# Patient Record
Sex: FEMALE | Race: WHITE | Employment: OTHER | URBAN - METROPOLITAN AREA
[De-identification: names, ages, dates, MRNs, and addresses within clinical notes are randomized per-mention and may not be internally consistent; named-entity substitution may affect disease eponyms.]

---

## 2018-09-14 ENCOUNTER — HOSPITAL ENCOUNTER (OUTPATIENT)
Age: 73
Discharge: HOME | End: 2018-09-16
Payer: MEDICARE

## 2018-09-14 LAB
ALBUMIN SERPL-MCNC: 3.9 G/DL (ref 3.5–5.2)
ALP BLD-CCNC: 71 U/L (ref 35–104)
ALT SERPL-CCNC: 21 U/L (ref 0–32)
ANION GAP SERPL CALCULATED.3IONS-SCNC: 13 MMOL/L (ref 7–16)
AST SERPL-CCNC: 24 U/L (ref 0–31)
BACTERIA: ABNORMAL /HPF
BASOPHILS ABSOLUTE: 0.03 E9/L (ref 0–0.2)
BASOPHILS RELATIVE PERCENT: 0.7 % (ref 0–2)
BILIRUB SERPL-MCNC: 0.4 MG/DL (ref 0–1.2)
BILIRUBIN URINE: NEGATIVE
BLOOD, URINE: NEGATIVE
BUN BLDV-MCNC: 21 MG/DL (ref 8–23)
CALCIUM SERPL-MCNC: 9 MG/DL (ref 8.6–10.2)
CHLORIDE BLD-SCNC: 101 MMOL/L (ref 98–107)
CHOLESTEROL, TOTAL: 203 MG/DL (ref 0–199)
CLARITY: ABNORMAL
CO2: 27 MMOL/L (ref 22–29)
COLOR: YELLOW
CREAT SERPL-MCNC: 0.8 MG/DL (ref 0.5–1)
CRYSTALS, UA: ABNORMAL
EOSINOPHILS ABSOLUTE: 0.11 E9/L (ref 0.05–0.5)
EOSINOPHILS RELATIVE PERCENT: 2.7 % (ref 0–6)
EPITHELIAL CELLS, UA: ABNORMAL /HPF
FOLATE: >20 NG/ML (ref 4.8–24.2)
GFR AFRICAN AMERICAN: >60
GFR NON-AFRICAN AMERICAN: >60 ML/MIN/1.73
GLUCOSE BLD-MCNC: 105 MG/DL (ref 74–109)
GLUCOSE URINE: NEGATIVE MG/DL
HCT VFR BLD CALC: 41.1 % (ref 34–48)
HDLC SERPL-MCNC: 95 MG/DL
HEMOGLOBIN: 13.1 G/DL (ref 11.5–15.5)
IMMATURE GRANULOCYTES #: 0.01 E9/L
IMMATURE GRANULOCYTES %: 0.2 % (ref 0–5)
KETONES, URINE: NEGATIVE MG/DL
LDL CHOLESTEROL CALCULATED: 98 MG/DL (ref 0–99)
LEUKOCYTE ESTERASE, URINE: ABNORMAL
LYMPHOCYTES ABSOLUTE: 1.15 E9/L (ref 1.5–4)
LYMPHOCYTES RELATIVE PERCENT: 28.7 % (ref 20–42)
MCH RBC QN AUTO: 33.2 PG (ref 26–35)
MCHC RBC AUTO-ENTMCNC: 31.9 % (ref 32–34.5)
MCV RBC AUTO: 104.1 FL (ref 80–99.9)
MICROALBUMIN UR-MCNC: <12 MG/L
MONOCYTES ABSOLUTE: 0.36 E9/L (ref 0.1–0.95)
MONOCYTES RELATIVE PERCENT: 9 % (ref 2–12)
NEUTROPHILS ABSOLUTE: 2.35 E9/L (ref 1.8–7.3)
NEUTROPHILS RELATIVE PERCENT: 58.7 % (ref 43–80)
NITRITE, URINE: NEGATIVE
PDW BLD-RTO: 13.9 FL (ref 11.5–15)
PH UA: 6 (ref 5–9)
PLATELET # BLD: 275 E9/L (ref 130–450)
PMV BLD AUTO: 9.3 FL (ref 7–12)
POTASSIUM SERPL-SCNC: 4.4 MMOL/L (ref 3.5–5)
PROTEIN UA: NEGATIVE MG/DL
RBC # BLD: 3.95 E12/L (ref 3.5–5.5)
RBC UA: ABNORMAL /HPF (ref 0–2)
SODIUM BLD-SCNC: 141 MMOL/L (ref 132–146)
SPECIFIC GRAVITY UA: 1.02 (ref 1–1.03)
TOTAL PROTEIN: 6.1 G/DL (ref 6.4–8.3)
TRIGL SERPL-MCNC: 51 MG/DL (ref 0–149)
TSH SERPL DL<=0.05 MIU/L-ACNC: 1.79 UIU/ML (ref 0.27–4.2)
UROBILINOGEN, URINE: 0.2 E.U./DL
VITAMIN B-12: 1001 PG/ML (ref 211–946)
VITAMIN D 25-HYDROXY: 98 NG/ML (ref 30–100)
VLDLC SERPL CALC-MCNC: 10 MG/DL
WBC # BLD: 4 E9/L (ref 4.5–11.5)
WBC UA: ABNORMAL /HPF (ref 0–5)

## 2018-09-14 PROCEDURE — 82306 VITAMIN D 25 HYDROXY: CPT

## 2018-09-14 PROCEDURE — 81001 URINALYSIS AUTO W/SCOPE: CPT

## 2018-09-14 PROCEDURE — 85025 COMPLETE CBC W/AUTO DIFF WBC: CPT

## 2018-09-14 PROCEDURE — 82746 ASSAY OF FOLIC ACID SERUM: CPT

## 2018-09-14 PROCEDURE — 84443 ASSAY THYROID STIM HORMONE: CPT

## 2018-09-14 PROCEDURE — 82044 UR ALBUMIN SEMIQUANTITATIVE: CPT

## 2018-09-14 PROCEDURE — 80053 COMPREHEN METABOLIC PANEL: CPT

## 2018-09-14 PROCEDURE — 80061 LIPID PANEL: CPT

## 2018-09-14 PROCEDURE — 82607 VITAMIN B-12: CPT

## 2019-11-11 ENCOUNTER — OFFICE VISIT (OUTPATIENT)
Dept: ORTHOPEDIC SURGERY | Age: 74
End: 2019-11-11
Payer: MEDICARE

## 2019-11-11 VITALS — BODY MASS INDEX: 17.93 KG/M2 | HEIGHT: 64 IN | WEIGHT: 105 LBS

## 2019-11-11 DIAGNOSIS — M17.11 PRIMARY OSTEOARTHRITIS OF RIGHT KNEE: Primary | ICD-10-CM

## 2019-11-11 PROCEDURE — G8400 PT W/DXA NO RESULTS DOC: HCPCS | Performed by: ORTHOPAEDIC SURGERY

## 2019-11-11 PROCEDURE — 4040F PNEUMOC VAC/ADMIN/RCVD: CPT | Performed by: ORTHOPAEDIC SURGERY

## 2019-11-11 PROCEDURE — 1036F TOBACCO NON-USER: CPT | Performed by: ORTHOPAEDIC SURGERY

## 2019-11-11 PROCEDURE — G8484 FLU IMMUNIZE NO ADMIN: HCPCS | Performed by: ORTHOPAEDIC SURGERY

## 2019-11-11 PROCEDURE — 99213 OFFICE O/P EST LOW 20 MIN: CPT | Performed by: ORTHOPAEDIC SURGERY

## 2019-11-11 PROCEDURE — 1123F ACP DISCUSS/DSCN MKR DOCD: CPT | Performed by: ORTHOPAEDIC SURGERY

## 2019-11-11 PROCEDURE — G8419 CALC BMI OUT NRM PARAM NOF/U: HCPCS | Performed by: ORTHOPAEDIC SURGERY

## 2019-11-11 PROCEDURE — G8427 DOCREV CUR MEDS BY ELIG CLIN: HCPCS | Performed by: ORTHOPAEDIC SURGERY

## 2019-11-11 PROCEDURE — 1090F PRES/ABSN URINE INCON ASSESS: CPT | Performed by: ORTHOPAEDIC SURGERY

## 2019-11-11 PROCEDURE — 3017F COLORECTAL CA SCREEN DOC REV: CPT | Performed by: ORTHOPAEDIC SURGERY

## 2019-11-11 PROCEDURE — 20610 DRAIN/INJ JOINT/BURSA W/O US: CPT | Performed by: ORTHOPAEDIC SURGERY

## 2019-11-11 RX ORDER — TRIAMCINOLONE ACETONIDE 40 MG/ML
40 INJECTION, SUSPENSION INTRA-ARTICULAR; INTRAMUSCULAR ONCE
Status: COMPLETED | OUTPATIENT
Start: 2019-11-11 | End: 2019-11-11

## 2019-11-11 RX ADMIN — TRIAMCINOLONE ACETONIDE 40 MG: 40 INJECTION, SUSPENSION INTRA-ARTICULAR; INTRAMUSCULAR at 15:42

## 2020-05-11 ENCOUNTER — OFFICE VISIT (OUTPATIENT)
Dept: ORTHOPEDIC SURGERY | Age: 75
End: 2020-05-11
Payer: MEDICARE

## 2020-05-11 VITALS — HEIGHT: 64 IN | WEIGHT: 105 LBS | BODY MASS INDEX: 17.93 KG/M2 | TEMPERATURE: 98 F

## 2020-05-11 PROCEDURE — G8400 PT W/DXA NO RESULTS DOC: HCPCS | Performed by: ORTHOPAEDIC SURGERY

## 2020-05-11 PROCEDURE — 1123F ACP DISCUSS/DSCN MKR DOCD: CPT | Performed by: ORTHOPAEDIC SURGERY

## 2020-05-11 PROCEDURE — 4040F PNEUMOC VAC/ADMIN/RCVD: CPT | Performed by: ORTHOPAEDIC SURGERY

## 2020-05-11 PROCEDURE — 3017F COLORECTAL CA SCREEN DOC REV: CPT | Performed by: ORTHOPAEDIC SURGERY

## 2020-05-11 PROCEDURE — 1090F PRES/ABSN URINE INCON ASSESS: CPT | Performed by: ORTHOPAEDIC SURGERY

## 2020-05-11 PROCEDURE — 99212 OFFICE O/P EST SF 10 MIN: CPT | Performed by: ORTHOPAEDIC SURGERY

## 2020-05-11 PROCEDURE — 1036F TOBACCO NON-USER: CPT | Performed by: ORTHOPAEDIC SURGERY

## 2020-05-11 PROCEDURE — G8419 CALC BMI OUT NRM PARAM NOF/U: HCPCS | Performed by: ORTHOPAEDIC SURGERY

## 2020-05-11 PROCEDURE — G8427 DOCREV CUR MEDS BY ELIG CLIN: HCPCS | Performed by: ORTHOPAEDIC SURGERY

## 2020-05-11 NOTE — PROGRESS NOTES
(PRENATAL PO) Take by mouth daily      Calcium Carbonate-Vitamin D (CALCIUM 600+D3 PO) Take by mouth 2 times daily      Pantothenic Acid 500 MG TABS Take by mouth 2 times daily Vit b 5      vitamin D (ERGOCALCIFEROL) 87949 UNITS CAPS capsule Take 50,000 Units by mouth once a week      cyanocobalamin 1000 MCG/ML injection Inject 1,000 mcg into the muscle See Admin Instructions EVERY OTHER WEEK      estradiol (ESTRACE) 0.5 MG tablet Take 0.5 mg by mouth daily      promethazine (PHENERGAN) 25 MG tablet Take 25 mg by mouth every 6 hours as needed for Nausea      Magnesium 500 MG CAPS Take by mouth daily      zinc 50 MG CAPS Take by mouth daily      temazepam (RESTORIL) 15 MG capsule Take 15 mg by mouth three times daily      omeprazole (PRILOSEC) 20 MG capsule Take 20 mg by mouth Daily        No current facility-administered medications for this visit.         Patient Active Problem List   Diagnosis    Trigger middle finger of left hand    Trigger finger, right middle finger       Past Medical History:   Diagnosis Date    Anemia     Arthritis     GERD (gastroesophageal reflux disease)        Past Surgical History:   Procedure Laterality Date     SECTION      X2    CHOLECYSTECTOMY      COLONOSCOPY      ENDOSCOPY, COLON, DIAGNOSTIC      HYSTERECTOMY      KNEE ARTHROSCOPY Right 2017    OTHER SURGICAL HISTORY Left 2016    tenovaginotomy left middle finger    OTHER SURGICAL HISTORY Right 6 2 16    tenovagintotomy middle finger    STOMACH SURGERY      CUT VAGUS NERVE D/T BLEEDING ULCERS AND PARTIAL GASTRECTOMY       No Known Allergies    Social History     Socioeconomic History    Marital status:      Spouse name: None    Number of children: None    Years of education: None    Highest education level: None   Occupational History    None   Social Needs    Financial resource strain: None    Food insecurity     Worry: None     Inability: None    Transportation needs

## 2021-07-27 RX ORDER — CHOLECALCIFEROL (VITAMIN D3) 1250 MCG
CAPSULE ORAL DAILY
COMMUNITY

## 2021-07-27 RX ORDER — ESZOPICLONE 2 MG/1
2 TABLET, FILM COATED ORAL NIGHTLY
COMMUNITY

## 2021-07-27 RX ORDER — LAMOTRIGINE 25 MG/1
25 TABLET ORAL 2 TIMES DAILY
COMMUNITY

## 2021-08-02 ENCOUNTER — ANESTHESIA EVENT (OUTPATIENT)
Dept: OPERATING ROOM | Age: 76
End: 2021-08-02
Payer: MEDICARE

## 2021-08-02 NOTE — ANESTHESIA PRE PROCEDURE
Department of Anesthesiology  Preprocedure Note       Name:  Dionicio Macias   Age:  76 y.o.  :  1945                                          MRN:  [id number]         Date:  2021      Surgeon: Toyin Bryant):  Jerome Lorenz DPM    Procedure: Procedure(s):  PARTIAL EXCISION OF BONE LEFT TERSAL-METATARSAL , DECOMPRESSION NERVE LEFT IDCN (CPT 81390)    Medications prior to admission:   Prior to Admission medications    Medication Sig Start Date End Date Taking? Authorizing Provider   lamoTRIgine (LAMICTAL) 25 MG tablet Take 25 mg by mouth 2 times daily Takes 2 tabs   Yes Historical Provider, MD   eszopiclone (LUNESTA) 2 MG TABS Take 2 mg by mouth nightly.    Yes Historical Provider, MD   Cholecalciferol (VITAMIN D3) 1.25 MG (27358 UT) CAPS Take by mouth daily   Yes Historical Provider, MD   Multiple Vitamins-Minerals (PRESERVISION AREDS 2+MULTI VIT PO) Take by mouth 2 times daily    Yes Historical Provider, MD   Copper Gluconate 2 MG CAPS Take by mouth daily   Yes Historical Provider, MD   CRANBERRY PO Take 4,200 mg by mouth daily   Yes Historical Provider, MD   Prenatal Vit-Fe Fumarate-FA (PRENATAL PO) Take by mouth daily   Yes Historical Provider, MD   Calcium Carbonate-Vitamin D (CALCIUM 600+D3 PO) Take by mouth daily    Yes Historical Provider, MD   Pantothenic Acid 500 MG TABS Take by mouth daily Vit b 5   Yes Historical Provider, MD   cyanocobalamin 1000 MCG/ML injection Inject 1,000 mcg into the muscle See Admin Instructions [facility] [facility]   Yes Historical Provider, MD   estradiol (ESTRACE) 0.5 MG tablet Take 0.5 mg by mouth daily   Yes Historical Provider, MD   Magnesium 500 MG CAPS Take by mouth daily   Yes Historical Provider, MD   zinc 50 MG CAPS Take by mouth daily   Yes Historical Provider, MD   omeprazole (PRILOSEC) 20 MG capsule Take 20 mg by mouth Daily am   Yes Historical Provider, MD   Potassium Gluconate 595 MG CAPS Take by mouth daily    Historical Provider, MD   promethazine (PHENERGAN) 25 MG tablet Take 25 mg by mouth every 6 hours as needed for Nausea    Historical Provider, MD       Current medications:    No current facility-administered medications for this encounter. Current Outpatient Medications   Medication Sig Dispense Refill    lamoTRIgine (LAMICTAL) 25 MG tablet Take 25 mg by mouth 2 times daily Takes 2 tabs      eszopiclone (LUNESTA) 2 MG TABS Take 2 mg by mouth nightly.       Cholecalciferol (VITAMIN D3) 1.25 MG (70468 UT) CAPS Take by mouth daily      Multiple Vitamins-Minerals (PRESERVISION AREDS 2+MULTI VIT PO) Take by mouth 2 times daily       Copper Gluconate 2 MG CAPS Take by mouth daily      CRANBERRY PO Take 4,200 mg by mouth daily      Prenatal Vit-Fe Fumarate-FA (PRENATAL PO) Take by mouth daily      Calcium Carbonate-Vitamin D (CALCIUM 600+D3 PO) Take by mouth daily       Pantothenic Acid 500 MG TABS Take by mouth daily Vit b 5      cyanocobalamin 1000 MCG/ML injection Inject 1,000 mcg into the muscle See Admin Instructions EVERY OTHER WEEK      estradiol (ESTRACE) 0.5 MG tablet Take 0.5 mg by mouth daily      Magnesium 500 MG CAPS Take by mouth daily      zinc 50 MG CAPS Take by mouth daily      omeprazole (PRILOSEC) 20 MG capsule Take 20 mg by mouth Daily am      Potassium Gluconate 595 MG CAPS Take by mouth daily      promethazine (PHENERGAN) 25 MG tablet Take 25 mg by mouth every 6 hours as needed for Nausea         Allergies:  No Known Allergies    Problem List:    Patient Active Problem List   Diagnosis Code    Trigger middle finger of left hand M65.332    Trigger finger, right middle finger M65.331       Past Medical History:        Diagnosis Date    Anemia     Arthritis     GERD (gastroesophageal reflux disease)     Seizure disorder ([facility] [address]     focal seizures     work up neg      last seizureJuly     controlled w lamictal        Past Surgical History:        Procedure Laterality Date     SECTION      X2    CHOLECYSTECTOMY      COLONOSCOPY      ENDOSCOPY, COLON, DIAGNOSTIC      HYSTERECTOMY      KNEE ARTHROSCOPY Right 09/14/2017    OTHER SURGICAL HISTORY Left 05 05 2016    tenovaginotomy left middle finger    OTHER SURGICAL HISTORY Right 6 2 16    tenovagintotomy middle finger    STOMACH SURGERY  1976    CUT VAGUS NERVE D/T BLEEDING ULCERS AND PARTIAL GASTRECTOMY       Social History:    Social History     Tobacco Use    Smoking status: Never Smoker    Smokeless tobacco: Never Used   Substance Use Topics    Alcohol use: No                                Counseling given: Not Answered      Vital Signs (Current):   Vitals:    07/27/21 1038   Weight: 105 lb (47.6 kg)   Height: 5' 4\" (1.626 m)                                              BP Readings from Last 3 Encounters:   09/14/17 118/73   06/02/16 109/66   05/05/16 101/66       NPO Status:   >8.H                                                                             BMI:   Wt Readings from Last 3 Encounters:   05/11/20 105 lb (47.6 kg)   11/11/19 105 lb (47.6 kg)   09/14/17 104 lb (47.2 kg)     Body mass index is 18.02 kg/m². CBC:   Lab Results   Component Value Date    WBC 4.0 09/14/2018    RBC 3.95 09/14/2018    HGB 13.1 09/14/2018    HCT 41.1 09/14/2018    .1 09/14/2018    RDW 13.9 09/14/2018     09/14/2018       CMP:   Lab Results   Component Value Date     09/14/2018    K 4.4 09/14/2018     09/14/2018    CO2 27 09/14/2018    BUN 21 09/14/2018    CREATININE 0.8 09/14/2018    GFRAA >60 09/14/2018    LABGLOM >60 09/14/2018    GLUCOSE 105 09/14/2018    GLUCOSE 91 04/16/2012    PROT 6.1 09/14/2018    CALCIUM 9.0 09/14/2018    BILITOT 0.4 09/14/2018    ALKPHOS 71 09/14/2018    AST 24 09/14/2018    ALT 21 09/14/2018       POC Tests: No results for input(s): POCGLU, POCNA, POCK, POCCL, POCBUN, POCHEMO, POCHCT in the last 72 hours.     Coags: No results found for: PROTIME, INR, APTT    HCG (If Applicable): No results found for: PREGTESTUR, PREGSERUM, HCG, HCGQUANT     ABGs: No results found for: PHART, PO2ART, UCV5RWR, NSF6BDP, BEART, M4PGTGET     Type & Screen (If Applicable):  No results found for: LABABO, LABRH    Drug/Infectious Status (If Applicable):  No results found for: HIV, HEPCAB    COVID-19 Screening (If Applicable): No results found for: COVID19        Anesthesia Evaluation  Patient summary reviewed no history of anesthetic complications:   Airway: Mallampati: II  TM distance: >3 FB   Neck ROM: full  Mouth opening: > = 3 FB Dental:          Pulmonary:Negative Pulmonary ROS breath sounds clear to auscultation      (-) not a current smoker                           Cardiovascular:Negative CV ROS  Exercise tolerance: good (>4 METS),         ECG reviewed  Rhythm: regular  Rate: normal                    Neuro/Psych:   Negative Neuro/Psych ROS  (+) seizures: well controlled,             GI/Hepatic/Renal:   (+) GERD:,           Endo/Other: Negative Endo/Other ROS                    Abdominal:   (+) scaphoid          Vascular: Other Findings:           Anesthesia Plan      MAC     ASA 3       Induction: intravenous. MIPS: Postoperative opioids intended and Prophylactic antiemetics administered. Anesthetic plan and risks discussed with patient. Plan discussed with CRNA.     Attending anesthesiologist reviewed and agrees with Preprocedure content      PAT Chart Review:  Chart reviewed per routine on August 2, 2021 at 9:25 AM by Lainey Marinelli MD.  (Final assessment and plan per day of surgery team.)      Lainey Marinelli MD   8/2/2021

## 2021-08-03 ENCOUNTER — ANESTHESIA (OUTPATIENT)
Dept: OPERATING ROOM | Age: 76
End: 2021-08-03
Payer: MEDICARE

## 2021-08-03 ENCOUNTER — HOSPITAL ENCOUNTER (OUTPATIENT)
Age: 76
Setting detail: OUTPATIENT SURGERY
Discharge: HOME | End: 2021-08-03
Attending: PODIATRIST | Admitting: PODIATRIST
Payer: MEDICARE

## 2021-08-03 VITALS
RESPIRATION RATE: 14 BRPM | OXYGEN SATURATION: 99 % | DIASTOLIC BLOOD PRESSURE: 79 MMHG | WEIGHT: 104 LBS | HEIGHT: 64 IN | HEART RATE: 76 BPM | BODY MASS INDEX: 17.75 KG/M2 | SYSTOLIC BLOOD PRESSURE: 156 MMHG | TEMPERATURE: 98.6 F

## 2021-08-03 VITALS
OXYGEN SATURATION: 100 % | SYSTOLIC BLOOD PRESSURE: 159 MMHG | TEMPERATURE: 98.6 F | RESPIRATION RATE: 17 BRPM | DIASTOLIC BLOOD PRESSURE: 83 MMHG

## 2021-08-03 DIAGNOSIS — M65.331 TRIGGER FINGER, RIGHT MIDDLE FINGER: Primary | ICD-10-CM

## 2021-08-03 DIAGNOSIS — M19.071 ARTHRITIS OF RIGHT ANKLE: ICD-10-CM

## 2021-08-03 DIAGNOSIS — G89.18 POST-OP PAIN: ICD-10-CM

## 2021-08-03 DIAGNOSIS — G57.62 MORTON NEUROMA, LEFT: ICD-10-CM

## 2021-08-03 PROCEDURE — 2709999900 HC NON-CHARGEABLE SUPPLY: Performed by: PODIATRIST

## 2021-08-03 PROCEDURE — 6370000000 HC RX 637 (ALT 250 FOR IP): Performed by: ANESTHESIOLOGY

## 2021-08-03 PROCEDURE — 7100000001 HC PACU RECOVERY - ADDTL 15 MIN: Performed by: PODIATRIST

## 2021-08-03 PROCEDURE — 3700000001 HC ADD 15 MINUTES (ANESTHESIA): Performed by: PODIATRIST

## 2021-08-03 PROCEDURE — 2500000003 HC RX 250 WO HCPCS: Performed by: PODIATRIST

## 2021-08-03 PROCEDURE — 3700000000 HC ANESTHESIA ATTENDED CARE: Performed by: PODIATRIST

## 2021-08-03 PROCEDURE — 2720000010 HC SURG SUPPLY STERILE: Performed by: PODIATRIST

## 2021-08-03 PROCEDURE — 3600000003 HC SURGERY LEVEL 3 BASE: Performed by: PODIATRIST

## 2021-08-03 PROCEDURE — 7100000011 HC PHASE II RECOVERY - ADDTL 15 MIN: Performed by: PODIATRIST

## 2021-08-03 PROCEDURE — 2580000003 HC RX 258: Performed by: ANESTHESIOLOGY

## 2021-08-03 PROCEDURE — 3600000013 HC SURGERY LEVEL 3 ADDTL 15MIN: Performed by: PODIATRIST

## 2021-08-03 PROCEDURE — 6360000002 HC RX W HCPCS: Performed by: PODIATRIST

## 2021-08-03 PROCEDURE — 7100000010 HC PHASE II RECOVERY - FIRST 15 MIN: Performed by: PODIATRIST

## 2021-08-03 PROCEDURE — 7100000000 HC PACU RECOVERY - FIRST 15 MIN: Performed by: PODIATRIST

## 2021-08-03 PROCEDURE — 6360000002 HC RX W HCPCS: Performed by: NURSE ANESTHETIST, CERTIFIED REGISTERED

## 2021-08-03 RX ORDER — ONDANSETRON 2 MG/ML
INJECTION INTRAMUSCULAR; INTRAVENOUS PRN
Status: DISCONTINUED | OUTPATIENT
Start: 2021-08-03 | End: 2021-08-03 | Stop reason: SDUPTHER

## 2021-08-03 RX ORDER — FENTANYL CITRATE 50 UG/ML
50 INJECTION, SOLUTION INTRAMUSCULAR; INTRAVENOUS EVERY 5 MIN PRN
Status: DISCONTINUED | OUTPATIENT
Start: 2021-08-03 | End: 2021-08-03 | Stop reason: HOSPADM

## 2021-08-03 RX ORDER — OXYCODONE HYDROCHLORIDE AND ACETAMINOPHEN 5; 325 MG/1; MG/1
1 TABLET ORAL EVERY 4 HOURS PRN
Status: DISCONTINUED | OUTPATIENT
Start: 2021-08-03 | End: 2021-08-03 | Stop reason: HOSPADM

## 2021-08-03 RX ORDER — SODIUM CHLORIDE, SODIUM LACTATE, POTASSIUM CHLORIDE, CALCIUM CHLORIDE 600; 310; 30; 20 MG/100ML; MG/100ML; MG/100ML; MG/100ML
INJECTION, SOLUTION INTRAVENOUS CONTINUOUS
Status: DISCONTINUED | OUTPATIENT
Start: 2021-08-03 | End: 2021-08-03 | Stop reason: HOSPADM

## 2021-08-03 RX ORDER — LABETALOL HYDROCHLORIDE 5 MG/ML
5 INJECTION, SOLUTION INTRAVENOUS EVERY 10 MIN PRN
Status: DISCONTINUED | OUTPATIENT
Start: 2021-08-03 | End: 2021-08-03 | Stop reason: HOSPADM

## 2021-08-03 RX ORDER — FAMOTIDINE 20 MG/1
20 TABLET, FILM COATED ORAL ONCE
Status: COMPLETED | OUTPATIENT
Start: 2021-08-03 | End: 2021-08-03

## 2021-08-03 RX ORDER — DIPHENHYDRAMINE HYDROCHLORIDE 50 MG/ML
12.5 INJECTION INTRAMUSCULAR; INTRAVENOUS
Status: DISCONTINUED | OUTPATIENT
Start: 2021-08-03 | End: 2021-08-03 | Stop reason: HOSPADM

## 2021-08-03 RX ORDER — CEFAZOLIN SODIUM 2 G/50ML
2000 SOLUTION INTRAVENOUS
Status: DISCONTINUED | OUTPATIENT
Start: 2021-08-03 | End: 2021-08-03 | Stop reason: HOSPADM

## 2021-08-03 RX ORDER — BUPIVACAINE HYDROCHLORIDE AND EPINEPHRINE 5; 5 MG/ML; UG/ML
INJECTION, SOLUTION EPIDURAL; INTRACAUDAL; PERINEURAL PRN
Status: DISCONTINUED | OUTPATIENT
Start: 2021-08-03 | End: 2021-08-03 | Stop reason: ALTCHOICE

## 2021-08-03 RX ORDER — POLYMYXIN B 500000 [USP'U]/1
INJECTION, POWDER, LYOPHILIZED, FOR SOLUTION INTRAMUSCULAR; INTRATHECAL; INTRAVENOUS; OPHTHALMIC PRN
Status: DISCONTINUED | OUTPATIENT
Start: 2021-08-03 | End: 2021-08-03 | Stop reason: ALTCHOICE

## 2021-08-03 RX ORDER — METOCLOPRAMIDE 10 MG/1
10 TABLET ORAL ONCE
Status: COMPLETED | OUTPATIENT
Start: 2021-08-03 | End: 2021-08-03

## 2021-08-03 RX ORDER — HYDROCODONE BITARTRATE AND ACETAMINOPHEN 5; 325 MG/1; MG/1
1 TABLET ORAL EVERY 6 HOURS PRN
Qty: 28 TABLET | Refills: 0 | Status: SHIPPED | OUTPATIENT
Start: 2021-08-03 | End: 2021-08-10

## 2021-08-03 RX ORDER — MEPERIDINE HYDROCHLORIDE 25 MG/ML
12.5 INJECTION INTRAMUSCULAR; INTRAVENOUS; SUBCUTANEOUS EVERY 5 MIN PRN
Status: DISCONTINUED | OUTPATIENT
Start: 2021-08-03 | End: 2021-08-03 | Stop reason: HOSPADM

## 2021-08-03 RX ORDER — PROPOFOL 10 MG/ML
INJECTION, EMULSION INTRAVENOUS PRN
Status: DISCONTINUED | OUTPATIENT
Start: 2021-08-03 | End: 2021-08-03 | Stop reason: SDUPTHER

## 2021-08-03 RX ORDER — LIDOCAINE HYDROCHLORIDE 20 MG/ML
INJECTION, SOLUTION INTRAVENOUS PRN
Status: DISCONTINUED | OUTPATIENT
Start: 2021-08-03 | End: 2021-08-03 | Stop reason: SDUPTHER

## 2021-08-03 RX ORDER — MORPHINE SULFATE 2 MG/ML
1 INJECTION, SOLUTION INTRAMUSCULAR; INTRAVENOUS EVERY 5 MIN PRN
Status: DISCONTINUED | OUTPATIENT
Start: 2021-08-03 | End: 2021-08-03 | Stop reason: HOSPADM

## 2021-08-03 RX ORDER — PROMETHAZINE HYDROCHLORIDE 25 MG/ML
25 INJECTION, SOLUTION INTRAMUSCULAR; INTRAVENOUS
Status: DISCONTINUED | OUTPATIENT
Start: 2021-08-03 | End: 2021-08-03 | Stop reason: HOSPADM

## 2021-08-03 RX ORDER — HYDROCODONE BITARTRATE AND ACETAMINOPHEN 5; 325 MG/1; MG/1
2 TABLET ORAL PRN
Status: COMPLETED | OUTPATIENT
Start: 2021-08-03 | End: 2021-08-03

## 2021-08-03 RX ORDER — HYDRALAZINE HYDROCHLORIDE 20 MG/ML
5 INJECTION INTRAMUSCULAR; INTRAVENOUS EVERY 10 MIN PRN
Status: DISCONTINUED | OUTPATIENT
Start: 2021-08-03 | End: 2021-08-03 | Stop reason: HOSPADM

## 2021-08-03 RX ORDER — HYDROCODONE BITARTRATE AND ACETAMINOPHEN 5; 325 MG/1; MG/1
1 TABLET ORAL PRN
Status: COMPLETED | OUTPATIENT
Start: 2021-08-03 | End: 2021-08-03

## 2021-08-03 RX ORDER — DEXAMETHASONE SODIUM PHOSPHATE 10 MG/ML
INJECTION, SOLUTION INTRAMUSCULAR; INTRAVENOUS PRN
Status: DISCONTINUED | OUTPATIENT
Start: 2021-08-03 | End: 2021-08-03 | Stop reason: SDUPTHER

## 2021-08-03 RX ORDER — FENTANYL CITRATE 50 UG/ML
INJECTION, SOLUTION INTRAMUSCULAR; INTRAVENOUS PRN
Status: DISCONTINUED | OUTPATIENT
Start: 2021-08-03 | End: 2021-08-03 | Stop reason: SDUPTHER

## 2021-08-03 RX ORDER — DEXAMETHASONE SODIUM PHOSPHATE 4 MG/ML
INJECTION, SOLUTION INTRA-ARTICULAR; INTRALESIONAL; INTRAMUSCULAR; INTRAVENOUS; SOFT TISSUE PRN
Status: DISCONTINUED | OUTPATIENT
Start: 2021-08-03 | End: 2021-08-03 | Stop reason: ALTCHOICE

## 2021-08-03 RX ADMIN — PROPOFOL 150 MG: 10 INJECTION, EMULSION INTRAVENOUS at 07:54

## 2021-08-03 RX ADMIN — HYDROCODONE BITARTRATE AND ACETAMINOPHEN 1 TABLET: 5; 325 TABLET ORAL at 09:57

## 2021-08-03 RX ADMIN — METOCLOPRAMIDE 10 MG: 10 TABLET ORAL at 06:50

## 2021-08-03 RX ADMIN — SODIUM CHLORIDE, POTASSIUM CHLORIDE, SODIUM LACTATE AND CALCIUM CHLORIDE: 600; 310; 30; 20 INJECTION, SOLUTION INTRAVENOUS at 07:08

## 2021-08-03 RX ADMIN — FAMOTIDINE 20 MG: 20 TABLET ORAL at 06:50

## 2021-08-03 RX ADMIN — CEFAZOLIN SODIUM 2000 MG: 2 SOLUTION INTRAVENOUS at 07:51

## 2021-08-03 RX ADMIN — DEXAMETHASONE SODIUM PHOSPHATE 10 MG: 10 INJECTION, SOLUTION INTRAMUSCULAR; INTRAVENOUS at 08:00

## 2021-08-03 RX ADMIN — LIDOCAINE HYDROCHLORIDE 100 MG: 20 INJECTION, SOLUTION INTRAVENOUS at 07:54

## 2021-08-03 RX ADMIN — ONDANSETRON 4 MG: 2 INJECTION INTRAMUSCULAR; INTRAVENOUS at 08:50

## 2021-08-03 RX ADMIN — FENTANYL CITRATE 100 MCG: 50 INJECTION, SOLUTION INTRAMUSCULAR; INTRAVENOUS at 07:54

## 2021-08-03 ASSESSMENT — PULMONARY FUNCTION TESTS
PIF_VALUE: 15
PIF_VALUE: 16
PIF_VALUE: 13
PIF_VALUE: 30
PIF_VALUE: 16
PIF_VALUE: 15
PIF_VALUE: 18
PIF_VALUE: 15
PIF_VALUE: 16
PIF_VALUE: 15
PIF_VALUE: 14
PIF_VALUE: 13
PIF_VALUE: 16
PIF_VALUE: 16
PIF_VALUE: 24
PIF_VALUE: 15
PIF_VALUE: 16
PIF_VALUE: 15
PIF_VALUE: 16
PIF_VALUE: 1
PIF_VALUE: 15
PIF_VALUE: 14
PIF_VALUE: 15
PIF_VALUE: 0
PIF_VALUE: 15
PIF_VALUE: 1
PIF_VALUE: 16
PIF_VALUE: 15
PIF_VALUE: 15
PIF_VALUE: 16
PIF_VALUE: 16
PIF_VALUE: 15
PIF_VALUE: 16
PIF_VALUE: 15
PIF_VALUE: 16
PIF_VALUE: 15
PIF_VALUE: 16
PIF_VALUE: 15
PIF_VALUE: 16
PIF_VALUE: 14
PIF_VALUE: 15
PIF_VALUE: 17
PIF_VALUE: 16
PIF_VALUE: 14
PIF_VALUE: 15
PIF_VALUE: 19
PIF_VALUE: 16
PIF_VALUE: 18
PIF_VALUE: 15
PIF_VALUE: 15
PIF_VALUE: 17
PIF_VALUE: 16
PIF_VALUE: 22
PIF_VALUE: 15
PIF_VALUE: 16
PIF_VALUE: 15

## 2021-08-03 ASSESSMENT — PAIN - FUNCTIONAL ASSESSMENT: PAIN_FUNCTIONAL_ASSESSMENT: 0-10

## 2021-08-03 ASSESSMENT — PAIN SCALES - GENERAL
PAINLEVEL_OUTOF10: 0
PAINLEVEL_OUTOF10: 3
PAINLEVEL_OUTOF10: 0

## 2021-08-03 ASSESSMENT — LIFESTYLE VARIABLES: SMOKING_STATUS: 0

## 2021-08-03 NOTE — H&P
Patient Name: Loki Pierson is a 77 yo female     Chief Complaint of:  pain and tenderness and paraesthesia to left dorsal midfoot     Present Illness:  consistant with diagnosis      Past Medical History:        Diagnosis Date    Anemia     Arthritis     GERD (gastroesophageal reflux disease)     Seizure disorder ([facility] [address]     focal seizures     work up neg      last seizureJuly     controlled w lamictal    ·     Past Surgical History:        Procedure Laterality Date     SECTION      X2    CHOLECYSTECTOMY      COLONOSCOPY      ENDOSCOPY, COLON, DIAGNOSTIC      HYSTERECTOMY      KNEE ARTHROSCOPY Right 2017    OTHER SURGICAL HISTORY Left 2016    tenovaginotomy left middle finger    OTHER SURGICAL HISTORY Right 6 2 16    tenovagintotomy middle finger    STOMACH SURGERY      CUT VAGUS NERVE D/T BLEEDING ULCERS AND PARTIAL GASTRECTOMY   ·     Medications Prior to Admission:    · Medications Prior to Admission: lamoTRIgine (LAMICTAL) 25 MG tablet, Take 25 mg by mouth 2 times daily Takes 2 tabs  · eszopiclone (LUNESTA) 2 MG TABS, Take 2 mg by mouth nightly.   · Cholecalciferol (VITAMIN D3) 1.25 MG (39444 UT) CAPS, Take by mouth daily  · Multiple Vitamins-Minerals (PRESERVISION AREDS 2+MULTI VIT PO), Take by mouth 2 times daily   · Copper Gluconate 2 MG CAPS, Take by mouth daily  · CRANBERRY PO, Take 4,200 mg by mouth daily  · Prenatal Vit-Fe Fumarate-FA (PRENATAL PO), Take by mouth daily  · Calcium Carbonate-Vitamin D (CALCIUM 600+D3 PO), Take by mouth daily   · Pantothenic Acid 500 MG TABS, Take by mouth daily Vit b 5  · cyanocobalamin 1000 MCG/ML injection, Inject 1,000 mcg into the muscle See Admin Instructions EVERY OTHER WEEK  · estradiol (ESTRACE) 0.5 MG tablet, Take 0.5 mg by mouth daily  · Magnesium 500 MG CAPS, Take by mouth daily  · zinc 50 MG CAPS, Take by mouth daily  · omeprazole (PRILOSEC) 20 MG capsule, Take 20 mg by mouth Daily am  · Potassium Gluconate 595 MG CAPS, Take by mouth daily  · promethazine (PHENERGAN) 25 MG tablet, Take 25 mg by mouth every 6 hours as needed for Nausea    Allergies:  Patient has no known allergies. Social History:   · TOBACCO:   reports that she has never smoked. She has never used smokeless tobacco.  · ETOH:   reports no history of alcohol use. DRUGS:   Social History     Substance and Sexual Activity   Drug Use No   ·     Family History:   · History reviewed. No pertinent family history. RADIOLOGY:  No orders to display       Vitals:    BP (!) 148/84   Pulse 77   Temp 97.5 °F (36.4 °C) (Temporal)   Resp 20   Ht 5' 4\" (1.626 m)   Wt 104 lb (47.2 kg)   SpO2 97%   BMI 17.85 kg/m²     LABS:   No results for input(s): WBC, HGB, HCT, PLT in the last 72 hours. PHYSICAL EXAMINATION / GENERAL APPEARANCE:     HEAD: negative        HEART: negative     LUNGS: deferred     ABDOMEN: exam deferred     OTHER SIGNIFICANT FINDINGS:  N/A     IMPRESSION/INITIAL DIAGNOSIS:  Prominent bony exostosis appreciable to dorsal left foot which is tender to palpation. Parasthesia illicited with percussion over course of intermediate dorsal cutaneous nerve on left foot.

## 2021-08-03 NOTE — BRIEF OP NOTE
Brief Postoperative Note      Patient: Connie Williamson  YOB: 1945  MRN: [id number]    Date of Procedure: 8/3/2021    Pre-Op Diagnosis: 1. Osteoarthritis of left dorsal midfoot 2.  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

## 2021-08-03 NOTE — ANESTHESIA POSTPROCEDURE EVALUATION
Department of Anesthesiology  Postprocedure Note    Patient: Tonya Kohler  MRN: [id number]  YOB: 1945  Date of evaluation: 8/3/2021  Time:  11:22 AM     Procedure Summary     Date: 08/03/21 Room / Location: [address] [place] [address] 03 / [address] [facility]    Anesthesia Start: 6950 Anesthesia Stop: 9716    Procedure: PARTIAL EXCISION OF BONE LEFT TERSAL-METATARSAL , DECOMPRESSION NERVE LEFT IDCN (CPT 41734) (Left ) Diagnosis: (OSTEOARTHRITIS LEFT MIDFOOT , NEURITIS IDCN)    Surgeons: Myra Haywood DPM Responsible Provider: Pa Davis MD    Anesthesia Type: MAC ASA Status: 3          Anesthesia Type: MAC    Dolores Phase I: Dolores Score: 10    Dolores Phase II: Dolores Score: 10    Last vitals: Reviewed and per EMR flowsheets.        Anesthesia Post Evaluation    Patient location during evaluation: PACU  Patient participation: complete - patient participated  Level of consciousness: awake  Airway patency: patent  Nausea & Vomiting: no nausea and no vomiting  Complications: no  Cardiovascular status: hemodynamically stable  Respiratory status: room air  Hydration status: stable

## 2021-08-03 NOTE — OP NOTE
Operative Note      Patient: Tonya Kohler  YOB: 1945  MRN: [id number]    Date of Procedure: 8/3/2021    Pre-Op Diagnosis: 1. Dorsal exostosis of the left foot 2. 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 This dorsal exostosis is painful with shoe gear and also is causing numbness and paresthesias in the area of the medial and intermediate dorsal cutaneous nerves. She has failed all conservative treatment measures which include but are not limited to offloading, immobilization and corticosteroid injections. All indications for the procedure were discussed the patient in great detail prior to surgery. All anticipated benefits as well as all possible risks and complications associated with the surgery and anesthesia itself were also disclosed with the patient prior to surgery. All questions were answered to the patient's satisfaction. No guarantees were made pertaining to the outcome of the procedure. Given all this the patient freely elected to proceed with surgery. Surgical consent was signed in the preop holding area. The left foot was marked as the correct operative site in the preoperative holding area.     Detailed Description of Procedure:     After interfacing with all appropriate monitors in the preoperative area, the patient was wheeled back to the operating room by the operating staff and laid on the operative table in supine position. General anesthesia was then administered by the anesthesia team, with the patient was under the proper plane of anesthesia and appropriate timeout was called with proper patient, procedure and extremity identification. All parties present agreed. A well-padded pneumatic thigh tourniquet was then applied to the left lower extremity. Left lower extremity then scrubbed prepped and draped in usual aseptic manner and lowered on the operative field. The tourniquet was then inflated to 300 mmHg. Attention was then directed to the dorsal aspect of the left midfoot for approximately 6 cm longitudinal incision was made over the dorsal medial midfoot coursing over the first tarsometatarsal joint. Careful anatomic dissection utilizing both blunt and sharp dissection methods was carried through the cutaneous, subcutaneous fascial and periosteal layers to the level of the bone in order to expose the proximal first metatarsal and the medial cuneiform. Care was taken during dissection to cauterize and ligate any bleeders as deemed necessary. Care was also taken to protect and retract any vital neurovascular or tendinous structures. Upon adequate exposure a prominent dorsal exostosis could be appreciable encompassing the dorsal base of the first metatarsal the distal medial cuneiform and this exostosis did extent laterally across the midfoot with involvement of the intermediate cuneiform and bases of the second and third metatarsals. All joint spaces within the tarsometatarsal joint did seem clean and well-defined intraoperatively. Care was taken to identify both the medial dorsal cutaneous nerve and the intermediate dorsal cutaneous nerve of the left foot. These anatomic structures were then freed from their soft tissue adhesions using blunt dissection methods. A curved osteotome was then used to resect the prominent dorsal exostosis from this anatomic region.   Care was also taken to use a reciprocating rasp in order to smooth down any uneven surfaces or rough edges. All resected bone was then passed off the operative field. Intraoperative fluoroscopy was used to confirm adequate resection of the dorsal exostosis on the lateral view with planar surfaces the dorsal midfoot and the areas of the tarsometatarsal region. The surgical site was then flushed with copious amounts normal sterile saline. The fascial layers were then reapproximated using 3-0 Vicryl. The skin was then reapproximated using 3-0 Prolene. The tourniquet was then deflated total tourniquet time was 61 minutes. A postoperative block consisting of 0.5% Marcaine plain was then administered to the left foot near the surgical site. The intermediate dorsal cutaneous nerve was also injected with 1 cc of Decadron. A dry sterile dressing consisting of 4 x 4 gauze and Gabi Latvian was then applied to the lower left foot. A pneumatic cam walker was also applied to the left lower extremity. The patient tolerated both the procedure and anesthesia well and was transferred to PACU with vital signs stable and vascular status intact to all digits of the left foot. After a short period of postoperative monitoring patient may be discharged home. She has been given clearly defined postoperative instructions pertaining to weightbearing status. She may weight-bear as tolerated to her left foot using a cam walker. She has been dispensed medications for postoperative pain control and has been instructed extensively on postoperative DVT prophylaxis. She should follow-up in clinic within 1 week of today's surgery.     Electronically signed by Carlos Larsen DPM on 8/3/2021 at 9:20 AM

## 2021-10-02 ENCOUNTER — APPOINTMENT (OUTPATIENT)
Dept: GENERAL RADIOLOGY | Age: 76
End: 2021-10-02
Payer: MEDICARE

## 2021-10-02 ENCOUNTER — HOSPITAL ENCOUNTER (EMERGENCY)
Age: 76
Discharge: HOME | End: 2021-10-02
Attending: EMERGENCY MEDICINE
Payer: MEDICARE

## 2021-10-02 VITALS
WEIGHT: 105 LBS | TEMPERATURE: 97.7 F | HEART RATE: 93 BPM | RESPIRATION RATE: 16 BRPM | BODY MASS INDEX: 18.02 KG/M2 | SYSTOLIC BLOOD PRESSURE: 147 MMHG | DIASTOLIC BLOOD PRESSURE: 90 MMHG | OXYGEN SATURATION: 98 %

## 2021-10-02 DIAGNOSIS — S91.111A LACERATION OF RIGHT GREAT TOE WITHOUT FOREIGN BODY PRESENT OR DAMAGE TO NAIL, INITIAL ENCOUNTER: Primary | ICD-10-CM

## 2021-10-02 PROCEDURE — 90715 TDAP VACCINE 7 YRS/> IM: CPT | Performed by: STUDENT IN AN ORGANIZED HEALTH CARE EDUCATION/TRAINING PROGRAM

## 2021-10-02 PROCEDURE — 90471 IMMUNIZATION ADMIN: CPT | Performed by: STUDENT IN AN ORGANIZED HEALTH CARE EDUCATION/TRAINING PROGRAM

## 2021-10-02 PROCEDURE — 73630 X-RAY EXAM OF FOOT: CPT

## 2021-10-02 PROCEDURE — 6360000002 HC RX W HCPCS: Performed by: STUDENT IN AN ORGANIZED HEALTH CARE EDUCATION/TRAINING PROGRAM

## 2021-10-02 PROCEDURE — 12002 RPR S/N/AX/GEN/TRNK2.6-7.5CM: CPT

## 2021-10-02 PROCEDURE — 99282 EMERGENCY DEPT VISIT SF MDM: CPT

## 2021-10-02 RX ADMIN — TETANUS TOXOID, REDUCED DIPHTHERIA TOXOID AND ACELLULAR PERTUSSIS VACCINE, ADSORBED 0.5 ML: 5; 2.5; 8; 8; 2.5 SUSPENSION INTRAMUSCULAR at 08:33

## 2021-10-02 ASSESSMENT — PAIN DESCRIPTION - ORIENTATION: ORIENTATION: RIGHT

## 2021-10-02 ASSESSMENT — ENCOUNTER SYMPTOMS
SINUS PAIN: 0
VOMITING: 0
ABDOMINAL PAIN: 0
SHORTNESS OF BREATH: 0
SORE THROAT: 0
COUGH: 0
CHEST TIGHTNESS: 0
EYE REDNESS: 0
EYE PAIN: 0
NAUSEA: 0
SINUS PRESSURE: 0

## 2021-10-02 ASSESSMENT — PAIN DESCRIPTION - PAIN TYPE: TYPE: ACUTE PAIN

## 2021-10-02 ASSESSMENT — PAIN SCALES - GENERAL: PAINLEVEL_OUTOF10: 5

## 2021-10-02 ASSESSMENT — PAIN DESCRIPTION - LOCATION: LOCATION: FOOT

## 2021-10-02 ASSESSMENT — PAIN DESCRIPTION - DESCRIPTORS: DESCRIPTORS: SORE

## 2021-10-02 ASSESSMENT — PAIN DESCRIPTION - FREQUENCY: FREQUENCY: CONTINUOUS

## 2021-10-02 ASSESSMENT — PAIN DESCRIPTION - ONSET: ONSET: ON-GOING

## 2021-10-02 NOTE — ED PROVIDER NOTES
[facility] [facility]  Department of Emergency Medicine     Written by: Elyse Goddard DO  Patient Name: Merry Felipe  Attending Provider: Chayito Alonzo MD  Admit Date: 10/2/2021  7:58 AM  MRN: [id number]                   : 1945        Chief Complaint   Patient presents with    Laceration     stepped on asiya jar glass approx. hour ago to right foot , first digit    - Chief complaint    Ms. Sharon Jung is a 75 yo female without significant past medical history who presents to the ED with a laceration to her right foot. Patient states she dropped a glass jar earlier this morning and proceeded to step on it with her right foot. She has a 3 cm laceration on the plantar surface of her right great toe. She stated that the wound was initially bleeding a lot but has since slowed to the point that it is at upon presentation. Patient is not where she is up-to-date on her tetanus and will receive updated tetanus booster. Patient denies any systemic symptoms. Patient does have pain with palpation of the area around the laceration that is aggravated with movement and pressure and not relieved by anything. She states that it has been progressively improving since onset. Patient denies any associated symptoms. Patient denies fever, chills, nausea, vomiting, shortness of breath, chest pain, abdominal pain, bowel or urinary changes, headaches or vision changes. Review of Systems   Constitutional: Negative for chills, fatigue and fever. HENT: Negative for congestion, sinus pressure, sinus pain, sneezing, sore throat and tinnitus. Eyes: Negative for pain and redness. Respiratory: Negative for cough, chest tightness and shortness of breath. Cardiovascular: Negative for chest pain, palpitations and leg swelling. Gastrointestinal: Negative for abdominal pain, nausea and vomiting. Genitourinary: Negative for dysuria, flank pain, frequency, hematuria and urgency.    Musculoskeletal: Positive for gait problem (Laceration on R toe ). Negative for joint swelling and myalgias. Skin: Positive for wound (Laceration to right great toe). Negative for rash. Neurological: Negative for dizziness, light-headedness and headaches. Physical Exam  Constitutional:       Appearance: Normal appearance. She is normal weight. HENT:      Head: Normocephalic and atraumatic. Right Ear: External ear normal.      Left Ear: External ear normal.      Mouth/Throat:      Mouth: Mucous membranes are moist.      Pharynx: Oropharynx is clear. Eyes:      Extraocular Movements: Extraocular movements intact. Conjunctiva/sclera: Conjunctivae normal.   Cardiovascular:      Rate and Rhythm: Normal rate and regular rhythm. Pulses: Normal pulses. Heart sounds: Normal heart sounds. Pulmonary:      Effort: Pulmonary effort is normal.      Breath sounds: Normal breath sounds. Abdominal:      General: Abdomen is flat. Bowel sounds are normal.      Palpations: Abdomen is soft. Musculoskeletal:         General: Normal range of motion. Cervical back: Normal range of motion and neck supple. Skin:     General: Skin is warm and dry. Findings: Lesion (3 cm laceration to the plantar surface of R great toe) present. Neurological:      General: No focal deficit present. Mental Status: She is alert and oriented to person, place, and time. Mental status is at baseline. Psychiatric:         Mood and Affect: Mood normal.         Behavior: Behavior normal.          Procedures     LACERATION REPAIR  PROCEDURE NOTE:  Unless otherwise indicated, this procedure was done or directly supervised by the ED attending. Laceration #: 1. Location: Right great toe plantar surface  Length: 3 cm. The wound area was irrigated with sterile saline and draped in a sterile fashion. The wound area was anesthetized with Lidocaine 1% without epinephrine. WOUND COMPLEXITY:    Debridement: None.   Undermining: None.  Wound Margins Revised: None. Flaps Aligned: yes. The wound was explored with the following results no foreign body or tendon injury seen. The wound was closed with 4-0 Prolene using interrupted sutures. Dressing:  a sterile dressing was placed. Total number suture: 5    MDM  Number of Diagnoses or Management Options  Laceration of right great toe without foreign body present or damage to nail, initial encounter  Diagnosis management comments: This is a 77 yo female without significant past medical history who presents to the ED with a laceration to her right foot. Wound was deep to subdermal tissue and required laceration repair with sutures. Patient's tetanus booster was updated in the department. Right foot x-ray was ordered and revealed no retained foreign bodies. Laceration was repaired with 4-0 Prolene using 5 sutures and will be covered with a sterile dressing. Patient has been informed to take Tylenol for pain. If any increasing erythema or pain surrounding laceration encouraged patient is been told to turn to the ED. Patient been told to follow-up with her PCP regarding today's events. She has been told to return to ED if her symptoms worsen return or change at any time. --------------------------------------------- PAST HISTORY ---------------------------------------------  Past Medical History:  has a past medical history of Anemia, Arthritis, GERD (gastroesophageal reflux disease), and [facility] Past Surgical History:  has a past surgical history that includes Stomach surgery ();  section; Hysterectomy; Cholecystectomy; Colonoscopy; Endoscopy, colon, diagnostic; other surgical history (Left, 2016); other surgical history (Right, 16); Knee arthroscopy (Right, 2017); and Foot surgery (Left, 8/3/2021). Social History:  reports that she has never smoked.  She has never used smokeless tobacco. She reports that she does not drink alcohol and does not use drugs. Family History: family history is not on file. The patients home medications have been reviewed. Allergies: Patient has no known allergies. -------------------------------------------------- RESULTS -------------------------------------------------  Labs:  No results found for this visit on 10/02/21. Radiology:  XR FOOT RIGHT (MIN 3 VIEWS)    (Results Pending)       ------------------------- NURSING NOTES AND VITALS REVIEWED ---------------------------  Date / Time Roomed:  10/2/2021  7:58 AM  ED Bed Assignment:  08/08    The nursing notes within the ED encounter and vital signs as below have been reviewed. BP (!) 147/90   Pulse 93   Temp 97.7 °F (36.5 °C) (Infrared)   Resp 16   Wt 105 lb (47.6 kg)   SpO2 98%   BMI 18.02 kg/m²   Oxygen Saturation Interpretation: Normal      ------------------------------------------ PROGRESS NOTES ------------------------------------------  8:38 AM EDT  I have spoken with the patient and discussed todays results, in addition to providing specific details for the plan of care and counseling regarding the diagnosis and prognosis. Their questions are answered at this time and they are agreeable with the plan. I discussed at length with them reasons for immediate return here for re evaluation. They will followup with their primary care physician by calling their office on Monday.      --------------------------------- ADDITIONAL PROVIDER NOTES ---------------------------------  At this time the patient is without objective evidence of an acute process requiring hospitalization or inpatient management. They have remained hemodynamically stable throughout their entire ED visit and are stable for discharge with outpatient follow-up. The plan has been discussed in detail and they are aware of the specific conditions for emergent return, as well as the importance of follow-up.       New Prescriptions    No medications on file Diagnosis:  1. Laceration of right great toe without foreign body present or damage to nail, initial encounter        Disposition:  Patient's disposition: Discharge to home  Patient's condition is stable. Patient was seen and evaluated by myself and my attending Mello Causey MD. Assessment and Plan discussed with attending provider, please see attestation for final plan of care.      Misa Martinez, [address], DO  Resident  10/02/21 [phone]

## 2021-10-02 NOTE — ED NOTES
Non-adherent gauze and Kerlix applied to right foot. Sutures to toe completed. [facility]            Alem Ochoa RN  10/02/21 [id number]

## 2021-10-02 NOTE — ED NOTES
Discharge instructions with follow up and suture removal reviewed, verbalized understanding.                Bud Barnett RN  10/02/21 [id number]

## 2022-05-03 DIAGNOSIS — M81.0 SENILE OSTEOPOROSIS: Primary | ICD-10-CM

## 2022-06-21 ENCOUNTER — HOSPITAL ENCOUNTER (OUTPATIENT)
Age: 77
Discharge: HOME | End: 2022-06-21
Payer: MEDICARE

## 2022-06-21 DIAGNOSIS — M81.0 SENILE OSTEOPOROSIS: ICD-10-CM

## 2022-06-21 LAB
ALBUMIN SERPL-MCNC: 4.1 G/DL (ref 3.5–5.2)
ALP BLD-CCNC: 80 U/L (ref 35–104)
ALT SERPL-CCNC: 22 U/L (ref 0–32)
ANION GAP SERPL CALCULATED.3IONS-SCNC: 8 MMOL/L (ref 7–16)
AST SERPL-CCNC: 35 U/L (ref 0–31)
BILIRUB SERPL-MCNC: 0.3 MG/DL (ref 0–1.2)
BUN BLDV-MCNC: 24 MG/DL (ref 6–23)
CALCIUM SERPL-MCNC: 9.3 MG/DL (ref 8.6–10.2)
CHLORIDE BLD-SCNC: 102 MMOL/L (ref 98–107)
CO2: 29 MMOL/L (ref 22–29)
CREAT SERPL-MCNC: 0.7 MG/DL (ref 0.5–1)
GFR AFRICAN AMERICAN: >60
GFR NON-AFRICAN AMERICAN: >60 ML/MIN/1.73
GLUCOSE BLD-MCNC: 120 MG/DL (ref 74–99)
POTASSIUM SERPL-SCNC: 4.3 MMOL/L (ref 3.5–5)
SODIUM BLD-SCNC: 139 MMOL/L (ref 132–146)
TOTAL PROTEIN: 6.3 G/DL (ref 6.4–8.3)

## 2022-06-21 PROCEDURE — 36415 COLL VENOUS BLD VENIPUNCTURE: CPT

## 2022-06-21 PROCEDURE — 80053 COMPREHEN METABOLIC PANEL: CPT

## 2022-06-28 ENCOUNTER — HOSPITAL ENCOUNTER (OUTPATIENT)
Dept: INFUSION THERAPY | Age: 77
Setting detail: INFUSION SERIES
Discharge: HOME | End: 2022-06-28
Payer: MEDICARE

## 2022-06-28 VITALS
RESPIRATION RATE: 16 BRPM | TEMPERATURE: 98.2 F | HEART RATE: 60 BPM | DIASTOLIC BLOOD PRESSURE: 67 MMHG | SYSTOLIC BLOOD PRESSURE: 154 MMHG | OXYGEN SATURATION: 98 %

## 2022-06-28 DIAGNOSIS — M81.0 SENILE OSTEOPOROSIS: Primary | ICD-10-CM

## 2022-06-28 PROCEDURE — 6360000002 HC RX W HCPCS: Performed by: FAMILY MEDICINE

## 2022-06-28 PROCEDURE — 96372 THER/PROPH/DIAG INJ SC/IM: CPT

## 2022-06-28 RX ADMIN — DENOSUMAB 60 MG: 60 INJECTION SUBCUTANEOUS at 12:03

## 2022-08-02 ENCOUNTER — APPOINTMENT (OUTPATIENT)
Dept: GENERAL RADIOLOGY | Age: 77
End: 2022-08-02
Payer: COMMERCIAL

## 2022-08-02 ENCOUNTER — HOSPITAL ENCOUNTER (EMERGENCY)
Age: 77
Discharge: HOME | End: 2022-08-02
Attending: EMERGENCY MEDICINE
Payer: COMMERCIAL

## 2022-08-02 VITALS
DIASTOLIC BLOOD PRESSURE: 60 MMHG | BODY MASS INDEX: 17.42 KG/M2 | HEIGHT: 64 IN | TEMPERATURE: 98.1 F | OXYGEN SATURATION: 100 % | SYSTOLIC BLOOD PRESSURE: 129 MMHG | WEIGHT: 102 LBS | RESPIRATION RATE: 18 BRPM | HEART RATE: 69 BPM

## 2022-08-02 DIAGNOSIS — S20.219A CONTUSION OF CHEST WALL, UNSPECIFIED LATERALITY, INITIAL ENCOUNTER: Primary | ICD-10-CM

## 2022-08-02 LAB
ANION GAP SERPL CALCULATED.3IONS-SCNC: 8 MMOL/L (ref 7–16)
BASOPHILS ABSOLUTE: 0.04 E9/L (ref 0–0.2)
BASOPHILS RELATIVE PERCENT: 0.9 % (ref 0–2)
BUN BLDV-MCNC: 20 MG/DL (ref 6–23)
CALCIUM SERPL-MCNC: 9.1 MG/DL (ref 8.6–10.2)
CHLORIDE BLD-SCNC: 110 MMOL/L (ref 98–107)
CO2: 24 MMOL/L (ref 22–29)
CREAT SERPL-MCNC: 0.6 MG/DL (ref 0.5–1)
EOSINOPHILS ABSOLUTE: 0.06 E9/L (ref 0.05–0.5)
EOSINOPHILS RELATIVE PERCENT: 1.4 % (ref 0–6)
GFR AFRICAN AMERICAN: >60
GFR NON-AFRICAN AMERICAN: >60 ML/MIN/1.73
GLUCOSE BLD-MCNC: 131 MG/DL (ref 74–99)
HCT VFR BLD CALC: 41.5 % (ref 34–48)
HEMOGLOBIN: 13.5 G/DL (ref 11.5–15.5)
IMMATURE GRANULOCYTES #: 0.01 E9/L
IMMATURE GRANULOCYTES %: 0.2 % (ref 0–5)
LYMPHOCYTES ABSOLUTE: 1.03 E9/L (ref 1.5–4)
LYMPHOCYTES RELATIVE PERCENT: 24 % (ref 20–42)
MCH RBC QN AUTO: 33.2 PG (ref 26–35)
MCHC RBC AUTO-ENTMCNC: 32.5 % (ref 32–34.5)
MCV RBC AUTO: 102 FL (ref 80–99.9)
MONOCYTES ABSOLUTE: 0.41 E9/L (ref 0.1–0.95)
MONOCYTES RELATIVE PERCENT: 9.5 % (ref 2–12)
NEUTROPHILS ABSOLUTE: 2.75 E9/L (ref 1.8–7.3)
NEUTROPHILS RELATIVE PERCENT: 64 % (ref 43–80)
PDW BLD-RTO: 14.4 FL (ref 11.5–15)
PLATELET # BLD: 317 E9/L (ref 130–450)
PMV BLD AUTO: 8.7 FL (ref 7–12)
POTASSIUM REFLEX MAGNESIUM: 4.3 MMOL/L (ref 3.5–5)
RBC # BLD: 4.07 E12/L (ref 3.5–5.5)
SODIUM BLD-SCNC: 142 MMOL/L (ref 132–146)
TROPONIN, HIGH SENSITIVITY: 7 NG/L (ref 0–9)
WBC # BLD: 4.3 E9/L (ref 4.5–11.5)

## 2022-08-02 PROCEDURE — 71046 X-RAY EXAM CHEST 2 VIEWS: CPT

## 2022-08-02 PROCEDURE — 96374 THER/PROPH/DIAG INJ IV PUSH: CPT

## 2022-08-02 PROCEDURE — 84484 ASSAY OF TROPONIN QUANT: CPT

## 2022-08-02 PROCEDURE — 85025 COMPLETE CBC W/AUTO DIFF WBC: CPT

## 2022-08-02 PROCEDURE — 80048 BASIC METABOLIC PNL TOTAL CA: CPT

## 2022-08-02 PROCEDURE — 99285 EMERGENCY DEPT VISIT HI MDM: CPT

## 2022-08-02 PROCEDURE — 6360000002 HC RX W HCPCS: Performed by: EMERGENCY MEDICINE

## 2022-08-02 PROCEDURE — 93005 ELECTROCARDIOGRAM TRACING: CPT | Performed by: EMERGENCY MEDICINE

## 2022-08-02 RX ORDER — KETOROLAC TROMETHAMINE 15 MG/ML
15 INJECTION, SOLUTION INTRAMUSCULAR; INTRAVENOUS ONCE
Status: COMPLETED | OUTPATIENT
Start: 2022-08-02 | End: 2022-08-02

## 2022-08-02 RX ORDER — TRAZODONE HYDROCHLORIDE 50 MG/1
50 TABLET ORAL NIGHTLY
COMMUNITY

## 2022-08-02 RX ORDER — LIDOCAINE 50 MG/G
1 PATCH TOPICAL DAILY
Qty: 10 PATCH | Refills: 0 | Status: SHIPPED | OUTPATIENT
Start: 2022-08-02 | End: 2022-08-12

## 2022-08-02 RX ADMIN — KETOROLAC TROMETHAMINE 15 MG: 15 INJECTION, SOLUTION INTRAMUSCULAR; INTRAVENOUS at 11:40

## 2022-08-02 ASSESSMENT — ENCOUNTER SYMPTOMS
COLOR CHANGE: 0
NAUSEA: 0
BLOOD IN STOOL: 0
SHORTNESS OF BREATH: 1
VOMITING: 0
COUGH: 0
RHINORRHEA: 0
ABDOMINAL PAIN: 0
BACK PAIN: 0

## 2022-08-02 ASSESSMENT — PAIN DESCRIPTION - LOCATION
LOCATION: RIB CAGE
LOCATION: RIB CAGE

## 2022-08-02 ASSESSMENT — PAIN - FUNCTIONAL ASSESSMENT: PAIN_FUNCTIONAL_ASSESSMENT: 0-10

## 2022-08-02 ASSESSMENT — PAIN SCALES - GENERAL: PAINLEVEL_OUTOF10: 8
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
## 2022-08-03 LAB
EKG ATRIAL RATE: 70 BPM
EKG P AXIS: 12 DEGREES
EKG P-R INTERVAL: 148 MS
EKG Q-T INTERVAL: 388 MS
EKG QRS DURATION: 90 MS
EKG QTC CALCULATION (BAZETT): 419 MS
EKG R AXIS: 60 DEGREES
EKG T AXIS: 68 DEGREES
EKG VENTRICULAR RATE: 70 BPM

## 2023-05-30 ENCOUNTER — HOSPITAL ENCOUNTER (OUTPATIENT)
Dept: INFUSION THERAPY | Age: 78
Setting detail: INFUSION SERIES
Discharge: HOME | End: 2023-05-30
Payer: MEDICARE

## 2023-05-30 VITALS
TEMPERATURE: 98 F | RESPIRATION RATE: 16 BRPM | SYSTOLIC BLOOD PRESSURE: 143 MMHG | OXYGEN SATURATION: 96 % | DIASTOLIC BLOOD PRESSURE: 69 MMHG | HEART RATE: 62 BPM

## 2023-05-30 DIAGNOSIS — M81.0 SENILE OSTEOPOROSIS: Primary | ICD-10-CM

## 2023-05-30 PROCEDURE — 96372 THER/PROPH/DIAG INJ SC/IM: CPT

## 2023-05-30 PROCEDURE — 6360000002 HC RX W HCPCS: Performed by: FAMILY MEDICINE

## 2023-05-30 RX ADMIN — DENOSUMAB 60 MG: 60 INJECTION SUBCUTANEOUS at 13:11

## 2023-11-10 ENCOUNTER — HOSPITAL ENCOUNTER (OUTPATIENT)
Dept: INFUSION THERAPY | Age: 78
Setting detail: INFUSION SERIES
End: 2023-11-10

## 2023-11-10 LAB — VITAMIN B-12: 355

## 2023-11-16 ENCOUNTER — HOSPITAL ENCOUNTER (OUTPATIENT)
Dept: INFUSION THERAPY | Age: 78
Setting detail: INFUSION SERIES
Discharge: HOME | End: 2023-11-16
Payer: MEDICARE

## 2023-11-16 VITALS
RESPIRATION RATE: 18 BRPM | SYSTOLIC BLOOD PRESSURE: 153 MMHG | HEART RATE: 77 BPM | TEMPERATURE: 98.6 F | DIASTOLIC BLOOD PRESSURE: 70 MMHG | OXYGEN SATURATION: 95 %

## 2023-11-16 DIAGNOSIS — M81.0 SENILE OSTEOPOROSIS: Primary | ICD-10-CM

## 2023-11-16 PROCEDURE — 96372 THER/PROPH/DIAG INJ SC/IM: CPT

## 2023-11-16 PROCEDURE — 6360000002 HC RX W HCPCS: Performed by: NURSE PRACTITIONER

## 2023-11-16 RX ADMIN — DENOSUMAB 60 MG: 60 INJECTION SUBCUTANEOUS at 13:23

## 2024-05-07 ENCOUNTER — HOSPITAL ENCOUNTER (EMERGENCY)
Age: 79
Discharge: HOME | End: 2024-05-07
Attending: EMERGENCY MEDICINE
Payer: MEDICARE

## 2024-05-07 VITALS
DIASTOLIC BLOOD PRESSURE: 85 MMHG | HEART RATE: 80 BPM | OXYGEN SATURATION: 97 % | TEMPERATURE: 98 F | RESPIRATION RATE: 18 BRPM | SYSTOLIC BLOOD PRESSURE: 165 MMHG

## 2024-05-07 DIAGNOSIS — N30.00 ACUTE CYSTITIS WITHOUT HEMATURIA: Primary | ICD-10-CM

## 2024-05-07 LAB
BACTERIA URNS QL MICRO: ABNORMAL
BILIRUB UR QL STRIP: NEGATIVE
CLARITY UR: ABNORMAL
COLOR UR: YELLOW
EPI CELLS #/AREA URNS HPF: ABNORMAL /HPF
GLUCOSE UR STRIP-MCNC: NEGATIVE MG/DL
HGB UR QL STRIP.AUTO: NEGATIVE
KETONES UR STRIP-MCNC: 15 MG/DL
LEUKOCYTE ESTERASE UR QL STRIP: NEGATIVE
NITRITE UR QL STRIP: POSITIVE
PH UR STRIP: 6 [PH] (ref 5–9)
PROT UR STRIP-MCNC: NEGATIVE MG/DL
RBC #/AREA URNS HPF: ABNORMAL /HPF
SP GR UR STRIP: 1.02 (ref 1–1.03)
UROBILINOGEN UR STRIP-ACNC: 1 EU/DL (ref 0–1)
WBC #/AREA URNS HPF: ABNORMAL /HPF

## 2024-05-07 PROCEDURE — 81001 URINALYSIS AUTO W/SCOPE: CPT

## 2024-05-07 PROCEDURE — 6370000000 HC RX 637 (ALT 250 FOR IP)

## 2024-05-07 PROCEDURE — 87086 URINE CULTURE/COLONY COUNT: CPT

## 2024-05-07 PROCEDURE — 99283 EMERGENCY DEPT VISIT LOW MDM: CPT

## 2024-05-07 RX ORDER — CEFDINIR 300 MG/1
300 CAPSULE ORAL ONCE
Status: COMPLETED | OUTPATIENT
Start: 2024-05-07 | End: 2024-05-07

## 2024-05-07 RX ORDER — CEFDINIR 300 MG/1
300 CAPSULE ORAL 2 TIMES DAILY
Qty: 14 CAPSULE | Refills: 0 | Status: SHIPPED | OUTPATIENT
Start: 2024-05-07 | End: 2024-05-07

## 2024-05-07 RX ORDER — CEFDINIR 300 MG/1
300 CAPSULE ORAL 2 TIMES DAILY
Qty: 14 CAPSULE | Refills: 0 | Status: SHIPPED | OUTPATIENT
Start: 2024-05-07 | End: 2024-05-14

## 2024-05-07 RX ADMIN — CEFDINIR 300 MG: 300 CAPSULE ORAL at 18:17

## 2024-05-07 NOTE — DISCHARGE INSTRUCTIONS
take your antibiotics as they are prescribed.  We recommend you follow-up with your primary care physician to follow-up on your symptoms and to discuss your recent emergency room visit.

## 2024-05-07 NOTE — ED NOTES
Called patients home phone to have patient come back to emergency department to go over discharge instructions, discharge medications and to be registered. Patient's  answered the phone, verbalized understanding and stated he would bring her back to emergency department.
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
## 2024-05-09 LAB
MICROORGANISM SPEC CULT: ABNORMAL
SPECIMEN DESCRIPTION: ABNORMAL

## 2024-05-23 ENCOUNTER — HOSPITAL ENCOUNTER (OUTPATIENT)
Age: 79
Discharge: HOME | End: 2024-05-23
Payer: MEDICARE

## 2024-05-23 DIAGNOSIS — M20.42 HAMMER TOE OF LEFT FOOT: ICD-10-CM

## 2024-05-23 LAB
EKG ATRIAL RATE: 72 BPM
EKG P AXIS: 25 DEGREES
EKG P-R INTERVAL: 122 MS
EKG Q-T INTERVAL: 408 MS
EKG QRS DURATION: 92 MS
EKG QTC CALCULATION (BAZETT): 446 MS
EKG R AXIS: 63 DEGREES
EKG T AXIS: 62 DEGREES
EKG VENTRICULAR RATE: 72 BPM

## 2024-05-23 PROCEDURE — 93005 ELECTROCARDIOGRAM TRACING: CPT | Performed by: ANESTHESIOLOGY

## 2024-08-16 ENCOUNTER — HOSPITAL ENCOUNTER (EMERGENCY)
Age: 79
Discharge: HOME | End: 2024-08-16
Payer: MEDICARE

## 2024-08-16 VITALS
OXYGEN SATURATION: 98 % | TEMPERATURE: 98.7 F | BODY MASS INDEX: 17.51 KG/M2 | HEART RATE: 70 BPM | DIASTOLIC BLOOD PRESSURE: 78 MMHG | WEIGHT: 102 LBS | RESPIRATION RATE: 18 BRPM | SYSTOLIC BLOOD PRESSURE: 184 MMHG

## 2024-08-16 DIAGNOSIS — S81.811A NONINFECTED SKIN TEAR OF RIGHT LOWER EXTREMITY, INITIAL ENCOUNTER: Primary | ICD-10-CM

## 2024-08-16 PROCEDURE — 99283 EMERGENCY DEPT VISIT LOW MDM: CPT

## 2024-08-16 RX ORDER — GINSENG 100 MG
CAPSULE ORAL ONCE
Status: DISCONTINUED | OUTPATIENT
Start: 2024-08-16 | End: 2024-08-16 | Stop reason: HOSPADM

## 2024-08-16 RX ORDER — BACITRACIN ZINC AND POLYMYXIN B SULFATE 500; 1000 [USP'U]/G; [USP'U]/G
OINTMENT TOPICAL
Qty: 30 G | Refills: 0 | Status: SHIPPED | OUTPATIENT
Start: 2024-08-16 | End: 2024-08-23

## 2024-08-16 ASSESSMENT — LIFESTYLE VARIABLES: HOW OFTEN DO YOU HAVE A DRINK CONTAINING ALCOHOL: NEVER

## 2024-08-16 NOTE — ED PROVIDER NOTES
Independent GEOVANNI Visit.      HPI:  24, Time: 4:28 PM EDT         Ursula Dawkins is a 78 y.o. female presenting to the ED for right leg laceration , beginning prior to arrival  .  The complaint has been persistent, mild in severity, and worsened by nothing.    Laceration to shin prior to arrival. Opened a drawer and hit her right lower leg. Tetnus is up to day no numbness       Review of Systems:   A complete review of systems was performed and pertinent positives and negatives are stated within HPI, all other systems reviewed and are negative.          --------------------------------------------- PAST HISTORY ---------------------------------------------  Past Medical History:  has a past medical history of Anemia, Arthritis, COVID-19, GERD (gastroesophageal reflux disease), Glaucoma, and Seizure disorder ([facility]).    Past Surgical History:  has a past surgical history that includes Stomach surgery ();  section; Hysterectomy; Cholecystectomy; Colonoscopy; Endoscopy, colon, diagnostic; other surgical history (Left, 2016); other surgical history (Right, 16); Knee arthroscopy (Right, 2017); and Foot surgery (Left, 8/3/2021).    Social History:  reports that she has never smoked. She has never used smokeless tobacco. She reports that she does not drink alcohol and does not use drugs.    Family History: family history is not on file.     The patient’s home medications have been reviewed.    Allergies: Patient has no known allergies.    -------------------------------------------------- RESULTS -------------------------------------------------  All laboratory and radiology results have been personally reviewed by myself   LABS:  No results found for this visit on 24.    RADIOLOGY:  Interpreted by Radiologist.  No orders to display       ------------------------- NURSING NOTES AND VITALS REVIEWED ---------------------------   The nursing notes within the ED encounter and vital signs as  below have been reviewed.   BP (!) 184/78   Pulse 70   Temp 98.7 °F (37.1 °C) (Infrared)   Resp 18   Wt 46.3 kg (102 lb)   SpO2 98%   BMI 17.51 kg/m²   Oxygen Saturation Interpretation: Normal      ---------------------------------------------------PHYSICAL EXAM--------------------------------------      Constitutional/General: Alert and oriented x3, well appearing, non toxic in NAD  Head: Normocephalic and atraumatic  Eyes: PERRL, EOMI  Mouth: Oropharynx clear, handling secretions, no trismus  Neck: Supple, full ROM,   Pulmonary: Lungs clear to auscultation bilaterally, no wheezes, rales, or rhonchi. Not in respiratory distress  Cardiovascular:  Regular rate and rhythm, no murmurs, gallops, or rubs. 2+ distal pulses  Abdomen: Soft, non tender, non distended,   Extremities: Moves all extremities x 4. Warm and well perfused no bone tenderness present. Skin tear present   Skin: warm and dry without rash  Neurologic: GCS 15,  Psych: Normal Affect      ------------------------------ ED COURSE/MEDICAL DECISION MAKING----------------------  Medications   bacitracin ointment (has no administration in time range)         ED COURSE:       Medical Decision Making:    Medical Decision Making  Amount and/or Complexity of Data Reviewed  External Data Reviewed: notes.  ECG/medicine tests:  Decision-making details documented in ED Course.    Risk  OTC drugs.       Medical Decision Making    Patient presents to the ER for laceration right lower leg .   Social Determinants include   Social Connections: Not on file    Social Determinants : None.   Chronic conditions    Past Medical History:   Diagnosis Date    Anemia     Arthritis     COVID-19 2023    mild    GERD (gastroesophageal reflux disease)     Glaucoma     Seizure disorder ([facility])     focal seizures, work up neg, last seizure July 2020 controlled w lamictal   .    Physical exam     Constitutional/General: Alert and oriented x3, well appearing, non toxic in NAD  Head:

## 2024-10-23 ENCOUNTER — INITIAL CONSULT (OUTPATIENT)
Dept: GERIATRIC MEDICINE | Age: 79
End: 2024-10-23

## 2024-10-23 VITALS
SYSTOLIC BLOOD PRESSURE: 122 MMHG | BODY MASS INDEX: 16.6 KG/M2 | DIASTOLIC BLOOD PRESSURE: 80 MMHG | WEIGHT: 96.7 LBS | RESPIRATION RATE: 14 BRPM | TEMPERATURE: 98.8 F | HEART RATE: 72 BPM

## 2024-10-23 DIAGNOSIS — G31.84 MCI (MILD COGNITIVE IMPAIRMENT): Primary | ICD-10-CM

## 2024-10-23 RX ORDER — MEMANTINE HYDROCHLORIDE 5 MG/1
5 TABLET ORAL DAILY
Qty: 90 TABLET | Refills: 3 | Status: SHIPPED | OUTPATIENT
Start: 2024-10-23

## 2024-10-23 RX ORDER — DIPHENHYDRAMINE HCL 25 MG
50 TABLET ORAL
COMMUNITY

## 2024-10-23 RX ORDER — GABAPENTIN 300 MG/1
300 CAPSULE ORAL DAILY
COMMUNITY

## 2024-10-23 SDOH — ECONOMIC STABILITY: FOOD INSECURITY: WITHIN THE PAST 12 MONTHS, YOU WORRIED THAT YOUR FOOD WOULD RUN OUT BEFORE YOU GOT MONEY TO BUY MORE.: NEVER TRUE

## 2024-10-23 SDOH — ECONOMIC STABILITY: FOOD INSECURITY: WITHIN THE PAST 12 MONTHS, THE FOOD YOU BOUGHT JUST DIDN'T LAST AND YOU DIDN'T HAVE MONEY TO GET MORE.: NEVER TRUE

## 2024-10-23 SDOH — ECONOMIC STABILITY: INCOME INSECURITY: HOW HARD IS IT FOR YOU TO PAY FOR THE VERY BASICS LIKE FOOD, HOUSING, MEDICAL CARE, AND HEATING?: NOT HARD AT ALL

## 2024-10-23 ASSESSMENT — PATIENT HEALTH QUESTIONNAIRE - PHQ9
SUM OF ALL RESPONSES TO PHQ QUESTIONS 1-9: 0
SUM OF ALL RESPONSES TO PHQ9 QUESTIONS 1 & 2: 0
SUM OF ALL RESPONSES TO PHQ QUESTIONS 1-9: 0
SUM OF ALL RESPONSES TO PHQ QUESTIONS 1-9: 0
1. LITTLE INTEREST OR PLEASURE IN DOING THINGS: NOT AT ALL
SUM OF ALL RESPONSES TO PHQ QUESTIONS 1-9: 0
2. FEELING DOWN, DEPRESSED OR HOPELESS: NOT AT ALL
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
## 2024-10-23 NOTE — PROGRESS NOTES
Chief Complaint   Patient presents with    Consultation     Referral from PCP, Dr KERWIN Powell re:  memory issues.  Here with  who states pt has had memory problems x 3 years.   History of seizures x 3 years as well.  Last seizure 1.5 - 2 years ago, per .      Dr Malone notified of above.

## 2024-12-12 ENCOUNTER — HOSPITAL ENCOUNTER (OUTPATIENT)
Dept: INFUSION THERAPY | Age: 79
Setting detail: INFUSION SERIES
Discharge: HOME | End: 2024-12-12
Payer: MEDICARE

## 2024-12-12 VITALS
OXYGEN SATURATION: 99 % | HEART RATE: 71 BPM | RESPIRATION RATE: 18 BRPM | DIASTOLIC BLOOD PRESSURE: 72 MMHG | SYSTOLIC BLOOD PRESSURE: 153 MMHG | TEMPERATURE: 97.2 F

## 2024-12-12 DIAGNOSIS — M81.0 SENILE OSTEOPOROSIS: Primary | ICD-10-CM

## 2024-12-12 PROCEDURE — 6360000002 HC RX W HCPCS: Performed by: FAMILY MEDICINE

## 2024-12-12 PROCEDURE — 96372 THER/PROPH/DIAG INJ SC/IM: CPT

## 2024-12-12 RX ADMIN — DENOSUMAB 60 MG: 60 INJECTION SUBCUTANEOUS at 09:14

## 2025-04-28 ENCOUNTER — OFFICE VISIT (OUTPATIENT)
Dept: GERIATRIC MEDICINE | Age: 80
End: 2025-04-28

## 2025-04-28 VITALS
BODY MASS INDEX: 16.7 KG/M2 | WEIGHT: 97.3 LBS | DIASTOLIC BLOOD PRESSURE: 62 MMHG | HEART RATE: 84 BPM | SYSTOLIC BLOOD PRESSURE: 106 MMHG | OXYGEN SATURATION: 97 %

## 2025-04-28 DIAGNOSIS — G31.84 MCI (MILD COGNITIVE IMPAIRMENT): Primary | ICD-10-CM

## 2025-04-28 RX ORDER — MEMANTINE HYDROCHLORIDE 5 MG/1
5 TABLET ORAL DAILY
Qty: 90 TABLET | Refills: 3 | Status: SHIPPED | OUTPATIENT
Start: 2025-04-28

## 2025-04-28 SDOH — ECONOMIC STABILITY: FOOD INSECURITY: WITHIN THE PAST 12 MONTHS, THE FOOD YOU BOUGHT JUST DIDN'T LAST AND YOU DIDN'T HAVE MONEY TO GET MORE.: NEVER TRUE

## 2025-04-28 SDOH — ECONOMIC STABILITY: FOOD INSECURITY: WITHIN THE PAST 12 MONTHS, YOU WORRIED THAT YOUR FOOD WOULD RUN OUT BEFORE YOU GOT MONEY TO BUY MORE.: NEVER TRUE

## 2025-04-28 ASSESSMENT — PATIENT HEALTH QUESTIONNAIRE - PHQ9
SUM OF ALL RESPONSES TO PHQ QUESTIONS 1-9: 0
2. FEELING DOWN, DEPRESSED OR HOPELESS: NOT AT ALL
SUM OF ALL RESPONSES TO PHQ QUESTIONS 1-9: 0
1. LITTLE INTEREST OR PLEASURE IN DOING THINGS: NOT AT ALL

## 2025-04-28 NOTE — PROGRESS NOTES
CC Assess cognition      Here with    Sold home in [place] recently  And not gaining weight despite supplements and high caloric foods   May have had a [place] 2 in 78  Here with  who is doing the IADL's  She does all ADL's   Sleeps well  Not depressed  Giving herself B12 shots   Knows her address  Eating junk food to gain weight  Knows graduation 1963 and Anniversary   And Knows all grandchildren and their whereabout  On Benadrly 50 mg hs and oxybutyn 5 mg bid (No benefit acc to her) will stop  Impression; MCI, OAB and Insomnia   Plan: D/C oxybutynin           And Wean Benadryl           Memantine 5 mg a day consider increase to 10 mg a day

## 2025-06-12 ENCOUNTER — HOSPITAL ENCOUNTER (OUTPATIENT)
Dept: INFUSION THERAPY | Age: 80
Setting detail: INFUSION SERIES
Discharge: HOME | End: 2025-06-12
Payer: MEDICARE

## 2025-06-12 VITALS
HEART RATE: 81 BPM | TEMPERATURE: 96.9 F | SYSTOLIC BLOOD PRESSURE: 141 MMHG | DIASTOLIC BLOOD PRESSURE: 70 MMHG | RESPIRATION RATE: 20 BRPM | OXYGEN SATURATION: 96 %

## 2025-06-12 DIAGNOSIS — M81.0 SENILE OSTEOPOROSIS: Primary | ICD-10-CM

## 2025-06-12 PROCEDURE — 6360000002 HC RX W HCPCS: Performed by: FAMILY MEDICINE

## 2025-06-12 PROCEDURE — 96372 THER/PROPH/DIAG INJ SC/IM: CPT

## 2025-06-12 RX ADMIN — DENOSUMAB 60 MG: 60 INJECTION SUBCUTANEOUS at 10:21

## (undated) DEVICE — SMALL TEAR CROSS CUT RASP (11.0 X 5.0MM)

## (undated) DEVICE — NEEDLE HYPO 25GA L1.5IN BLU POLYPR HUB S STL REG BVL STR

## (undated) DEVICE — SOLUTION IV IRRIG POUR BRL 0.9% SODIUM CHL 2F7124

## (undated) DEVICE — PEN: MARKING STD 100/CS: Brand: MEDICAL ACTION INDUSTRIES

## (undated) DEVICE — 1810 FOAM BLOCK NEEDLE COUNTER: Brand: DEVON

## (undated) DEVICE — BANDAGE,GAUZE,CONFORMING,3"X75",STRL,LF: Brand: MEDLINE

## (undated) DEVICE — NEEDLE HYPO 18GA L1.5IN PNK POLYPR HUB S STL THN WALL FILL

## (undated) DEVICE — GAUZE,SPONGE,4"X4",16PLY,XRAY,STRL,LF: Brand: MEDLINE

## (undated) DEVICE — DRESSING GZ XRFRM 4X4(25/BX 6BX/CS)

## (undated) DEVICE — INTENDED FOR TISSUE SEPARATION, AND OTHER PROCEDURES THAT REQUIRE A SHARP SURGICAL BLADE TO PUNCTURE OR CUT.: Brand: BARD-PARKER ® STAINLESS STEEL BLADES

## (undated) DEVICE — TOWEL OR BLUEE 16X26IN ST 8 PACK ORB08 16X26ORTWL

## (undated) DEVICE — GAUZE,SPONGE,4"X4",16PLY,STRL,LF,10/TRAY: Brand: MEDLINE

## (undated) DEVICE — BASIC PACK: Brand: CONVERTORS

## (undated) DEVICE — MEDI-VAC NON-CONDUCTIVE SUCTION TUBING: Brand: CARDINAL HEALTH

## (undated) DEVICE — DRAPE 84X54IN RADIOLOGY C ARM KEYBOARD

## (undated) DEVICE — 3M™ MICROPORE™ TAPE, 1530-1: Brand: 3M™ MICROPORE™

## (undated) DEVICE — 12 ML SYRINGE,LUER-LOCK TIP: Brand: MONOJECT

## (undated) DEVICE — APPLICATOR PREP 26ML 0.7% IOD POVACRYLEX 74% ISO ALC ST

## (undated) DEVICE — DRAIN SURG L18IN DIA025IN 100% SIL RADPQ FOR CLS WND DRNAGE

## (undated) DEVICE — TIBURON EXTREMITY SHEET: Brand: CONVERTORS

## (undated) DEVICE — BLADE OPHTH GRN ROUNDED TIP 1 SIDE SHRP GRINDLESS MINI-BLDE

## (undated) DEVICE — GLOVE SURG 6.5 TRIFLEX SHORT WIDE FINGER

## (undated) DEVICE — HANDLE CVR PATENTED RETENTION DISC STRL LIGHT SHLD

## (undated) DEVICE — PENCIL ES L3M BTTN SWCH HOLSTER W/ BLDE ELECTRD EDGE